# Patient Record
Sex: FEMALE | Race: BLACK OR AFRICAN AMERICAN | NOT HISPANIC OR LATINO | Employment: FULL TIME | URBAN - METROPOLITAN AREA
[De-identification: names, ages, dates, MRNs, and addresses within clinical notes are randomized per-mention and may not be internally consistent; named-entity substitution may affect disease eponyms.]

---

## 2020-10-22 RX ORDER — LEVOTHYROXINE SODIUM 0.03 MG/1
TABLET ORAL
Qty: 30 TABLET | Refills: 3 | Status: SHIPPED | OUTPATIENT
Start: 2020-10-22 | End: 2021-03-12 | Stop reason: SDUPTHER

## 2020-10-22 NOTE — TELEPHONE ENCOUNTER
PT is requesting Rx refill of Levothyroxine 25 MG (PT wasn't sure on the amount). Pt confirmed pickup location at [facility] on [address] in [place], [place]. She will be out in a couple of days.

## 2021-03-12 ENCOUNTER — LAB (OUTPATIENT)
Dept: LAB | Facility: HOSPITAL | Age: 46
End: 2021-03-12

## 2021-03-12 ENCOUNTER — OFFICE VISIT (OUTPATIENT)
Dept: ENDOCRINOLOGY | Facility: CLINIC | Age: 46
End: 2021-03-12

## 2021-03-12 VITALS
WEIGHT: 182 LBS | BODY MASS INDEX: 30.32 KG/M2 | HEIGHT: 65 IN | HEART RATE: 64 BPM | SYSTOLIC BLOOD PRESSURE: 124 MMHG | DIASTOLIC BLOOD PRESSURE: 68 MMHG | OXYGEN SATURATION: 99 % | TEMPERATURE: 97.3 F

## 2021-03-12 DIAGNOSIS — E04.0 SIMPLE GOITER: Primary | ICD-10-CM

## 2021-03-12 DIAGNOSIS — E04.0 SIMPLE GOITER: ICD-10-CM

## 2021-03-12 LAB
T4 FREE SERPL-MCNC: 1.13 NG/DL (ref 0.93–1.7)
TSH SERPL DL<=0.05 MIU/L-ACNC: 0.6 UIU/ML (ref 0.27–4.2)

## 2021-03-12 PROCEDURE — 84439 ASSAY OF FREE THYROXINE: CPT

## 2021-03-12 PROCEDURE — 84443 ASSAY THYROID STIM HORMONE: CPT

## 2021-03-12 PROCEDURE — 99213 OFFICE O/P EST LOW 20 MIN: CPT | Performed by: INTERNAL MEDICINE

## 2021-03-12 RX ORDER — SPIRONOLACTONE 25 MG/1
25 TABLET ORAL DAILY
COMMUNITY

## 2021-03-12 RX ORDER — METOPROLOL TARTRATE 100 MG/1
100 TABLET ORAL 2 TIMES DAILY
COMMUNITY

## 2021-03-12 RX ORDER — RIZATRIPTAN BENZOATE 10 MG/1
10 TABLET ORAL ONCE AS NEEDED
COMMUNITY

## 2021-03-12 RX ORDER — TIZANIDINE 4 MG/1
4 TABLET ORAL NIGHTLY PRN
COMMUNITY

## 2021-03-12 RX ORDER — OLMESARTAN MEDOXOMIL 40 MG/1
40 TABLET ORAL DAILY
COMMUNITY

## 2021-03-12 RX ORDER — PROPRANOLOL HYDROCHLORIDE 10 MG/1
10 TABLET ORAL 3 TIMES DAILY
COMMUNITY

## 2021-03-12 RX ORDER — TOPIRAMATE 100 MG/1
CAPSULE, EXTENDED RELEASE ORAL
COMMUNITY

## 2021-03-12 RX ORDER — LEVOTHYROXINE SODIUM 0.03 MG/1
TABLET ORAL
Qty: 90 TABLET | Refills: 3 | Status: SHIPPED | OUTPATIENT
Start: 2021-03-12

## 2021-03-12 RX ORDER — POTASSIUM CHLORIDE 1125 MG/1
15 TABLET, EXTENDED RELEASE ORAL 2 TIMES DAILY
COMMUNITY

## 2021-03-12 NOTE — PROGRESS NOTES
"     Office Note      Date: 2021  Patient Name: Ashley Lee  MRN: [id number]  : 1975    Chief Complaint   Patient presents with   • Thyroid Problem       History of Present Illness:   Ashley Lee is a 45 y.o. female who presents for Thyroid Problem  she has a goiter and is on levothyroxine 25 mcg per day. She feels better on the medication then when off of it.  She notes no neck changes.  No dysphagia. Occasional voice changes- better with the medication.  Gets a little cold.  More tired than normal    Subjective       Review of Systems:   Review of Systems   HENT: Positive for facial swelling, nosebleeds, postnasal drip and voice change.    Endocrine: Positive for cold intolerance.   Skin:        Hair loss   All other systems reviewed and are negative.      The following portions of the patient's history were reviewed and updated as appropriate: allergies, current medications, past family history, past medical history, past social history, past surgical history and problem list.    Objective     Visit Vitals  /68 (BP Location: Left arm, Patient Position: Sitting, Cuff Size: Adult)   Pulse 64   Temp 97.3 °F (36.3 °C) (Infrared)   Ht 165.1 cm (65\")   Wt 82.6 kg (182 lb)   SpO2 99%   BMI 30.29 kg/m²       Labs:    CBC w/DIFF  No results found for: WBC, RBC, HGB, HCT, MCV, MCH, MCHC, RDW, RDWSD, MPV, PLT, NEUTRORELPCT, LYMPHORELPCT, MONORELPCT, EOSRELPCT, BASORELPCT, AUTOIGPER, NEUTROABS, LYMPHSABS, MONOSABS, EOSABS, BASOSABS, AUTOIGNUM, NRBC    T4  No results found for: FREET4    TSH  No results found for: TSHBASE     Physical Exam:  Physical Exam  Vitals reviewed.   Neck:      Comments: Thyroid is slightly larger than a normal thyroid without nodules   Lymphadenopathy:      Cervical: No cervical adenopathy.   Neurological:      Mental Status: She is alert.         Assessment / Plan      Assessment & Plan:  Problem List Items Addressed This Visit        Other    Simple goiter - " Primary    Current Assessment & Plan     Clinically improved.  Will check levels and adjust dose based upon results          Relevant Medications    metoprolol tartrate (LOPRESSOR) 100 MG tablet    propranolol (INDERAL) 10 MG tablet    levothyroxine (SYNTHROID, LEVOTHROID) 25 MCG tablet    Other Relevant Orders    T4, Free    TSH           Mendel Reaves MD   03/12/2021